# Patient Record
Sex: MALE | Race: WHITE
[De-identification: names, ages, dates, MRNs, and addresses within clinical notes are randomized per-mention and may not be internally consistent; named-entity substitution may affect disease eponyms.]

---

## 2021-08-23 ENCOUNTER — HOSPITAL ENCOUNTER (EMERGENCY)
Dept: HOSPITAL 47 - EC | Age: 36
LOS: 1 days | Discharge: HOME | End: 2021-08-24
Payer: COMMERCIAL

## 2021-08-23 VITALS — RESPIRATION RATE: 18 BRPM

## 2021-08-23 DIAGNOSIS — F17.200: ICD-10-CM

## 2021-08-23 DIAGNOSIS — N39.0: Primary | ICD-10-CM

## 2021-08-23 DIAGNOSIS — F12.90: ICD-10-CM

## 2021-08-23 PROCEDURE — 96361 HYDRATE IV INFUSION ADD-ON: CPT

## 2021-08-23 PROCEDURE — 36415 COLL VENOUS BLD VENIPUNCTURE: CPT

## 2021-08-23 PROCEDURE — 82150 ASSAY OF AMYLASE: CPT

## 2021-08-23 PROCEDURE — 96374 THER/PROPH/DIAG INJ IV PUSH: CPT

## 2021-08-23 PROCEDURE — 87086 URINE CULTURE/COLONY COUNT: CPT

## 2021-08-23 PROCEDURE — 85730 THROMBOPLASTIN TIME PARTIAL: CPT

## 2021-08-23 PROCEDURE — 74177 CT ABD & PELVIS W/CONTRAST: CPT

## 2021-08-23 PROCEDURE — 85610 PROTHROMBIN TIME: CPT

## 2021-08-23 PROCEDURE — 81001 URINALYSIS AUTO W/SCOPE: CPT

## 2021-08-23 PROCEDURE — 85025 COMPLETE CBC W/AUTO DIFF WBC: CPT

## 2021-08-23 PROCEDURE — 99284 EMERGENCY DEPT VISIT MOD MDM: CPT

## 2021-08-23 PROCEDURE — 80053 COMPREHEN METABOLIC PANEL: CPT

## 2021-08-23 PROCEDURE — 83690 ASSAY OF LIPASE: CPT

## 2021-08-23 NOTE — ED
Male Urogenital HPI





- General


Chief complaint: Urogenital


Stated complaint: Hematuria


Time Seen by Provider: 08/23/21 22:54


Source: patient


Mode of arrival: ambulatory


Limitations: no limitations





- Related Data


                                  Previous Rx's











 Medication  Instructions  Recorded


 


Acetaminophen with Codeine 1 each PO Q4H #20 tab 01/27/15





[Tylenol w/codeine #3]  


 


Tobramycin 0.3% Ophth Soln [Tobrex 2 drop LEFT EYE Q4H #1 bottle 01/27/15





0.3% Ophth Soln]  











                                    Allergies











Allergy/AdvReac Type Severity Reaction Status Date / Time


 


No Known Allergies Allergy   Verified 08/23/21 22:33














Review of Systems


ROS Statement: 


Those systems with pertinent positive or pertinent negative responses have been 

documented in the HPI.





ROS Other: All systems not noted in ROS Statement are negative.





Past Medical History


Additional Past Medical History / Comment(s): sciatica


History of Any Multi-Drug Resistant Organisms: None Reported


Past Surgical History: No Surgical Hx Reported


Additional Past Surgical History / Comment(s): SPLENECTOMY


Past Psychological History: No Psychological Hx Reported


Smoking Status: Current every day smoker


Past Alcohol Use History: None Reported


Past Drug Use History: Marijuana





General Exam


Limitations: no limitations





Course


                                   Vital Signs











  08/23/21 08/24/21





  22:33 01:18


 


Temperature 99.2 F 98.8 F


 


Pulse Rate 87 99


 


Respiratory 18 18





Rate  


 


Blood Pressure 134/91 140/85


 


O2 Sat by Pulse 97 100





Oximetry  














Medical Decision Making





- Lab Data


Result diagrams: 


                                 08/24/21 00:41





                                   Lab Results











  08/23/21 08/24/21 Range/Units





  23:14 00:41 


 


WBC   18.1 H  (3.8-10.6)  k/uL


 


RBC   3.84 L  (4.30-5.90)  m/uL


 


Hgb   12.6 L  (13.0-17.5)  gm/dL


 


Hct   38.9 L  (39.0-53.0)  %


 


MCV   101.3 H  (80.0-100.0)  fL


 


MCH   32.9  (25.0-35.0)  pg


 


MCHC   32.5  (31.0-37.0)  g/dL


 


RDW   14.1  (11.5-15.5)  %


 


Plt Count   812 H  (150-450)  k/uL


 


MPV   7.7  


 


Neutrophils %   68  %


 


Lymphocytes %   15  %


 


Monocytes %   9  %


 


Eosinophils %   5  %


 


Basophils %   1  %


 


Neutrophils #   12.3 H  (1.3-7.7)  k/uL


 


Lymphocytes #   2.6  (1.0-4.8)  k/uL


 


Monocytes #   1.6 H  (0-1.0)  k/uL


 


Eosinophils #   0.9 H  (0-0.7)  k/uL


 


Basophils #   0.2  (0-0.2)  k/uL


 


Hypochromasia   Slight  


 


Macrocytosis   Slight  


 


Urine Color  Dark Red   


 


Urine Appearance  Turbid   (Clear)  


 


Urine RBC  >182 H   (0-5)  /hpf


 


Urine WBC  109 H   (0-5)  /hpf


 


Urine Bacteria  Rare H   (None)  /hpf


 


Urine Mucus  Occasional H   (None)  /hpf














Disposition


Clinical Impression: 


 Hematuria, UTI (urinary tract infection)





Disposition: HOME SELF-CARE


Condition: Good


Instructions (If sedation given, give patient instructions):  Hematuria (ED)


Is patient prescribed a controlled substance at d/c from ED?: No


Referrals: 


Lj Turner MD [Primary Care Provider] - 1-2 days

## 2021-08-24 VITALS — SYSTOLIC BLOOD PRESSURE: 131 MMHG | HEART RATE: 94 BPM | TEMPERATURE: 98.7 F | DIASTOLIC BLOOD PRESSURE: 88 MMHG

## 2021-08-24 LAB
ALBUMIN SERPL-MCNC: 4.1 G/DL (ref 3.5–5)
ALP SERPL-CCNC: 158 U/L (ref 38–126)
ALT SERPL-CCNC: 15 U/L (ref 4–49)
AMYLASE SERPL-CCNC: 98 U/L (ref 30–110)
ANION GAP SERPL CALC-SCNC: 9 MMOL/L
APTT BLD: 30.5 SEC (ref 22–30)
AST SERPL-CCNC: 22 U/L (ref 17–59)
BASOPHILS # BLD AUTO: 0.2 K/UL (ref 0–0.2)
BASOPHILS NFR BLD AUTO: 1 %
BUN SERPL-SCNC: 12 MG/DL (ref 9–20)
CALCIUM SPEC-MCNC: 9.8 MG/DL (ref 8.4–10.2)
CHLORIDE SERPL-SCNC: 99 MMOL/L (ref 98–107)
CO2 SERPL-SCNC: 29 MMOL/L (ref 22–30)
EOSINOPHIL # BLD AUTO: 0.9 K/UL (ref 0–0.7)
EOSINOPHIL NFR BLD AUTO: 5 %
ERYTHROCYTE [DISTWIDTH] IN BLOOD BY AUTOMATED COUNT: 3.84 M/UL (ref 4.3–5.9)
ERYTHROCYTE [DISTWIDTH] IN BLOOD: 14.1 % (ref 11.5–15.5)
GLUCOSE SERPL-MCNC: 90 MG/DL (ref 74–99)
HCT VFR BLD AUTO: 38.9 % (ref 39–53)
HGB BLD-MCNC: 12.6 GM/DL (ref 13–17.5)
INR PPP: 1 (ref ?–1.2)
LIPASE SERPL-CCNC: 198 U/L (ref 23–300)
LYMPHOCYTES # SPEC AUTO: 2.6 K/UL (ref 1–4.8)
LYMPHOCYTES NFR SPEC AUTO: 15 %
MCH RBC QN AUTO: 32.9 PG (ref 25–35)
MCHC RBC AUTO-ENTMCNC: 32.5 G/DL (ref 31–37)
MCV RBC AUTO: 101.3 FL (ref 80–100)
MONOCYTES # BLD AUTO: 1.6 K/UL (ref 0–1)
MONOCYTES NFR BLD AUTO: 9 %
NEUTROPHILS # BLD AUTO: 12.3 K/UL (ref 1.3–7.7)
NEUTROPHILS NFR BLD AUTO: 68 %
PLATELET # BLD AUTO: 812 K/UL (ref 150–450)
POTASSIUM SERPL-SCNC: 4.4 MMOL/L (ref 3.5–5.1)
PROT SERPL-MCNC: 7.9 G/DL (ref 6.3–8.2)
PT BLD: 10.8 SEC (ref 9–12)
RBC UR QL: >182 /HPF (ref 0–5)
SODIUM SERPL-SCNC: 137 MMOL/L (ref 137–145)
WBC # BLD AUTO: 18.1 K/UL (ref 3.8–10.6)
WBC # UR AUTO: 109 /HPF (ref 0–5)

## 2021-08-24 NOTE — CT
EXAMINATION TYPE: CT abdomen pelvis w con

 

DATE OF EXAM: 8/24/2021

 

COMPARISON: None

 

HISTORY: Abd pain

 

CT DLP: 635.6 mGycm

Automated exposure control for dose reduction was used.

 

CONTRAST: 

Performed with IV Contrast, patient injected with 100 mL of Isovue 300.

 

Images obtained from the diaphragm to the floor the pelvis with IV contrast.

 

There is small right pleural effusion. There is infiltrate and atelectasis at the lung bases. Heart s
ize is normal. There is no pericardial effusion. There is previous surgery at the gastric fundus. The
re is no evidence of pancreatic mass. Spleen is absent. There is some irregular fluid in the region o
f the splenic bed.

 

There is no adrenal mass. Kidneys show satisfactory contrast opacification. There is no hydronephrosi
s. Ureters are not dilated. Bladder distends smoothly. There is no inguinal hernia. Delayed images sh
ow normal renal excretion. There is no retroperitoneal adenopathy.

 

There is no free fluid in the pelvis. There is no mesenteric edema. There is no ascites or free air. 
There is no sign of a bowel obstruction.

 

Lumbar vertebra have normal alignment. Posterior elements are intact. Bony pelvis is intact. The hip 
joints are intact. There is no hip dysplasia. There is tiny calcified granuloma in the liver. There a
re multiple old posterior left side rib fractures.

 

IMPRESSION:

There is splenectomy. There is 5 x 3 cm low-density fluid collection at the splenectomy site consiste
nt with a seroma or chronic hematoma. There is some mild atelectasis at the lung bases. There is smal
l right pleural effusion. Multiple old left-sided rib fractures.

## 2021-08-27 ENCOUNTER — HOSPITAL ENCOUNTER (OUTPATIENT)
Dept: HOSPITAL 47 - LABWHC1 | Age: 36
Discharge: HOME | End: 2021-08-27
Payer: COMMERCIAL

## 2021-08-27 DIAGNOSIS — R31.9: Primary | ICD-10-CM

## 2021-08-27 LAB
ANION GAP SERPL CALC-SCNC: 10.5 MMOL/L (ref 4–12)
BASOPHILS # BLD AUTO: 0.22 X 10*3/UL (ref 0–0.1)
BASOPHILS NFR BLD AUTO: 1.6 %
BUN SERPL-SCNC: 14 MG/DL (ref 9–27)
BUN/CREAT SERPL: 17.5 RATIO (ref 12–20)
CALCIUM SPEC-MCNC: 9.6 MG/DL (ref 8.7–10.3)
CHLORIDE SERPL-SCNC: 103 MMOL/L (ref 96–109)
CO2 SERPL-SCNC: 26.5 MMOL/L (ref 21.6–31.8)
EOSINOPHIL # BLD AUTO: 1.14 X 10*3/UL (ref 0.04–0.35)
EOSINOPHIL NFR BLD AUTO: 8.3 %
ERYTHROCYTE [DISTWIDTH] IN BLOOD BY AUTOMATED COUNT: 4.19 X 10*6/UL (ref 4.4–5.6)
ERYTHROCYTE [DISTWIDTH] IN BLOOD: 13.8 % (ref 11.5–14.5)
GLUCOSE SERPL-MCNC: 94 MG/DL (ref 70–110)
HCT VFR BLD AUTO: 42.7 % (ref 39.6–50)
HGB BLD-MCNC: 13.3 G/DL (ref 13–17)
LYMPHOCYTES # SPEC AUTO: 3.05 X 10*3/UL (ref 0.9–5)
LYMPHOCYTES NFR SPEC AUTO: 22.2 %
MACROCYTES BLD QL SMEAR: (no result)
MCH RBC QN AUTO: 31.7 PG (ref 27–32)
MCHC RBC AUTO-ENTMCNC: 31.1 G/DL (ref 32–37)
MCV RBC AUTO: 101.9 FL (ref 80–97)
MONOCYTES # BLD AUTO: 1.12 X 10*3/UL (ref 0.2–1)
MONOCYTES NFR BLD AUTO: 8.1 %
NEUTROPHILS # BLD AUTO: 8.13 X 10*3/UL (ref 1.8–7.7)
NEUTROPHILS NFR BLD AUTO: 59.1 %
PLATELET # BLD AUTO: 819 X 10*3/UL (ref 140–440)
POTASSIUM SERPL-SCNC: 5.3 MMOL/L (ref 3.5–5.5)
SODIUM SERPL-SCNC: 140 MMOL/L (ref 135–145)
WBC # BLD AUTO: 13.76 X 10*3/UL (ref 4.5–10)

## 2021-08-27 PROCEDURE — 80048 BASIC METABOLIC PNL TOTAL CA: CPT

## 2021-08-27 PROCEDURE — 85025 COMPLETE CBC W/AUTO DIFF WBC: CPT

## 2021-08-27 PROCEDURE — 36415 COLL VENOUS BLD VENIPUNCTURE: CPT
